# Patient Record
Sex: FEMALE | Race: WHITE | NOT HISPANIC OR LATINO | URBAN - METROPOLITAN AREA
[De-identification: names, ages, dates, MRNs, and addresses within clinical notes are randomized per-mention and may not be internally consistent; named-entity substitution may affect disease eponyms.]

---

## 2024-07-18 ENCOUNTER — APPOINTMENT (OUTPATIENT)
Dept: LAB | Age: 64
End: 2024-07-18
Payer: COMMERCIAL

## 2024-07-18 DIAGNOSIS — Z00.00 ROUTINE ADULT HEALTH MAINTENANCE: ICD-10-CM

## 2024-07-18 DIAGNOSIS — R53.83 FATIGUE, UNSPECIFIED TYPE: ICD-10-CM

## 2024-07-18 DIAGNOSIS — M81.0 OSTEOPOROSIS, UNSPECIFIED OSTEOPOROSIS TYPE, UNSPECIFIED PATHOLOGICAL FRACTURE PRESENCE: ICD-10-CM

## 2024-07-18 LAB
25(OH)D3 SERPL-MCNC: 34.3 NG/ML (ref 30–100)
ALBUMIN SERPL BCG-MCNC: 4.4 G/DL (ref 3.5–5)
ALP SERPL-CCNC: 28 U/L (ref 34–104)
ALT SERPL W P-5'-P-CCNC: 18 U/L (ref 7–52)
ANION GAP SERPL CALCULATED.3IONS-SCNC: 12 MMOL/L (ref 4–13)
AST SERPL W P-5'-P-CCNC: 21 U/L (ref 13–39)
BASOPHILS # BLD AUTO: 0.05 THOUSANDS/ÂΜL (ref 0–0.1)
BASOPHILS NFR BLD AUTO: 1 % (ref 0–1)
BILIRUB SERPL-MCNC: 0.47 MG/DL (ref 0.2–1)
BUN SERPL-MCNC: 33 MG/DL (ref 5–25)
CALCIUM SERPL-MCNC: 10.1 MG/DL (ref 8.4–10.2)
CHLORIDE SERPL-SCNC: 100 MMOL/L (ref 96–108)
CHOLEST SERPL-MCNC: 249 MG/DL
CO2 SERPL-SCNC: 28 MMOL/L (ref 21–32)
CREAT SERPL-MCNC: 1.39 MG/DL (ref 0.6–1.3)
EOSINOPHIL # BLD AUTO: 0.22 THOUSAND/ÂΜL (ref 0–0.61)
EOSINOPHIL NFR BLD AUTO: 3 % (ref 0–6)
ERYTHROCYTE [DISTWIDTH] IN BLOOD BY AUTOMATED COUNT: 12.1 % (ref 11.6–15.1)
GFR SERPL CREATININE-BSD FRML MDRD: 39 ML/MIN/1.73SQ M
GLUCOSE P FAST SERPL-MCNC: 92 MG/DL (ref 65–99)
HCT VFR BLD AUTO: 41.8 % (ref 34.8–46.1)
HDLC SERPL-MCNC: 55 MG/DL
HGB BLD-MCNC: 13.2 G/DL (ref 11.5–15.4)
IMM GRANULOCYTES # BLD AUTO: 0.02 THOUSAND/UL (ref 0–0.2)
IMM GRANULOCYTES NFR BLD AUTO: 0 % (ref 0–2)
LDLC SERPL CALC-MCNC: 160 MG/DL (ref 0–100)
LYMPHOCYTES # BLD AUTO: 3.19 THOUSANDS/ÂΜL (ref 0.6–4.47)
LYMPHOCYTES NFR BLD AUTO: 46 % (ref 14–44)
MCH RBC QN AUTO: 30.3 PG (ref 26.8–34.3)
MCHC RBC AUTO-ENTMCNC: 31.6 G/DL (ref 31.4–37.4)
MCV RBC AUTO: 96 FL (ref 82–98)
MONOCYTES # BLD AUTO: 0.7 THOUSAND/ÂΜL (ref 0.17–1.22)
MONOCYTES NFR BLD AUTO: 10 % (ref 4–12)
NEUTROPHILS # BLD AUTO: 2.79 THOUSANDS/ÂΜL (ref 1.85–7.62)
NEUTS SEG NFR BLD AUTO: 40 % (ref 43–75)
NONHDLC SERPL-MCNC: 194 MG/DL
NRBC BLD AUTO-RTO: 0 /100 WBCS
PLATELET # BLD AUTO: 254 THOUSANDS/UL (ref 149–390)
PMV BLD AUTO: 11.3 FL (ref 8.9–12.7)
POTASSIUM SERPL-SCNC: 4.4 MMOL/L (ref 3.5–5.3)
PROT SERPL-MCNC: 7.2 G/DL (ref 6.4–8.4)
PTH-INTACT SERPL-MCNC: 51.4 PG/ML (ref 12–88)
RBC # BLD AUTO: 4.36 MILLION/UL (ref 3.81–5.12)
SODIUM SERPL-SCNC: 140 MMOL/L (ref 135–147)
TRIGL SERPL-MCNC: 172 MG/DL
TSH SERPL DL<=0.05 MIU/L-ACNC: 3.27 UIU/ML (ref 0.45–4.5)
WBC # BLD AUTO: 6.97 THOUSAND/UL (ref 4.31–10.16)

## 2024-07-18 PROCEDURE — 83970 ASSAY OF PARATHORMONE: CPT

## 2024-07-18 PROCEDURE — 80061 LIPID PANEL: CPT

## 2024-07-18 PROCEDURE — 84443 ASSAY THYROID STIM HORMONE: CPT

## 2024-07-18 PROCEDURE — 85025 COMPLETE CBC W/AUTO DIFF WBC: CPT

## 2024-07-18 PROCEDURE — 82306 VITAMIN D 25 HYDROXY: CPT

## 2024-07-18 PROCEDURE — 36415 COLL VENOUS BLD VENIPUNCTURE: CPT

## 2024-07-18 PROCEDURE — 80053 COMPREHEN METABOLIC PANEL: CPT

## 2024-07-30 ENCOUNTER — OFFICE VISIT (OUTPATIENT)
Dept: OBGYN CLINIC | Facility: CLINIC | Age: 64
End: 2024-07-30
Payer: COMMERCIAL

## 2024-07-30 VITALS — WEIGHT: 156 LBS | BODY MASS INDEX: 31.45 KG/M2 | HEIGHT: 59 IN

## 2024-07-30 DIAGNOSIS — M16.11 PRIMARY OSTEOARTHRITIS OF RIGHT HIP: ICD-10-CM

## 2024-07-30 DIAGNOSIS — M51.36 DDD (DEGENERATIVE DISC DISEASE), LUMBAR: ICD-10-CM

## 2024-07-30 DIAGNOSIS — M17.11 PRIMARY OSTEOARTHRITIS OF RIGHT KNEE: ICD-10-CM

## 2024-07-30 DIAGNOSIS — M25.551 ACUTE RIGHT HIP PAIN: Primary | ICD-10-CM

## 2024-07-30 DIAGNOSIS — M25.561 RIGHT KNEE PAIN, UNSPECIFIED CHRONICITY: ICD-10-CM

## 2024-07-30 PROCEDURE — 99244 OFF/OP CNSLTJ NEW/EST MOD 40: CPT | Performed by: FAMILY MEDICINE

## 2024-07-30 NOTE — LETTER
July 30, 2024     Douglas Lovett MD  [address] [place] [id number]    Patient: Jesus Shelley   YOB: 1960   Date of Visit: 7/30/2024       Dear Dr. Lovett:    Thank you for referring Jesus Shelley to me for evaluation. Below are the relevant portions of my assessment and plan of care.         If you have questions, please do not hesitate to call me. I look forward to following Jesus along with you.         Sincerely,        Liliya Vences,         CC: No Recipients

## 2024-07-30 NOTE — PROGRESS NOTES
Assessment:     1. Acute right hip pain  CANCELED: XR hip/pelvis 4+ vw right if performed      2. Right knee pain, unspecified chronicity  Ambulatory Referral to Orthopedic Surgery    XR knee 4+ vw right injury    Ambulatory Referral to Orthopedic Surgery      3. Primary osteoarthritis of right knee  Ambulatory Referral to Orthopedic Surgery      4. Primary osteoarthritis of right hip        5. DDD (degenerative disc disease), lumbar          Orders Placed This Encounter   Procedures    XR knee 4+ vw right injury    Ambulatory Referral to Orthopedic Surgery        Impression:   Right knee pain likely secondary to severe primary knee osteoarthritis.    Right hip pain likely secondary to primary hip osteoarthritis  Translation #[id number]    Conservative Management   We discussed different treatment options:  No prior documentations to review at this time.  Osteoarthritis treatment goal is to minimize pain and optimize function.  First-line management  Ice or Heat Therapy as needed 1-2 times daily for 10-20 minutes. As tolerated.   Over the counter Tylenol and/or NSAIDs  as needed based off your Past Medical Hx. Please follow product label for dosing and maximum limits.  If you have concerns about utilizing Tylenol/NSAIDs please discuss with your primary care physician.  Trial of over the counter Topical Analgesics such as Lidocaine cream or Voltaren Gel, as tolerated. If skin becomes irritated, discontinue use.   Please range joint through gentle range of motion, as tolerated.   Initiate Home Exercise Program for Stretching and Strengthening affected area.    Weight management may benefit lower extremity osteoarthritis.  Normal BMI 18 through 24.  May request referral to weight management or nutritionist/dietitian.  Initiate Formal Physical Therapy at any preferred location.   Due to severity of knee osteoarthritis will have patient follow-up with orthopedic surgeon at this time.  Additional management  Optional  treatment measures include the following  For hip/knee osteoarthritis: Medial  brace for patients with medial compartmental osteoarthritis.   Recommended potential medial .  Did discuss I wanted patient to be seen by orthopedic surgery first.  For hip/knee osteoarthritis assistive ambulation devices: Cane, walker   Recommended cane or walker  Interarticular glucocorticoid steroid if needing short-term pain relief   Reviewed.  Patient states she has not trialed corticosteroid injections to date.  May consider viscosupplementation and/or PRP injections  Referral to an orthopedic surgeon to discuss potential surgical management    Referral to orthopedic surgery at this time.          Imaging   All imaging from today was reviewed by myself and explained to the patient.   07/30/2024 right knee x-ray: No acute osseous abnormality, no loosening of hardware  Kellgren-Waqas Classification     Present Grade Radiological findings   [] 0 No radiological findings   [] 1 Doubtful narrowing of joint space and possible osteophytic lipping   [] 2 Definite osteophytes and possible narrowing of joint space   [] 3 Moderate multiple osteophytes, definitive narrowing of joint space, small pseudocystic areas with sclerotic walls and possibly deformity of bone contour   [x] 4 Large osteophytes, marked narrowing of joint space, severe sclerosis and definitive deformity of bone contour   **If more than 1 [x] is present patient is between grades     07/30/2024 right hip x-ray: No acute osseous abnormality, degenerative changes of the right hip joint as well as the lumbar spine  Kellgren-Waqas Classification     Present Grade Radiological findings   [] 0 No radiological findings   [] 1 Doubtful narrowing of joint space and possible osteophytic lipping   [x] 2 Definite osteophytes and possible narrowing of joint space   [] 3 Moderate multiple osteophytes, definitive narrowing of joint space, small pseudocystic areas with  "sclerotic walls and possibly deformity of bone contour   [] 4 Large osteophytes, marked narrowing of joint space, severe sclerosis and definitive deformity of bone contour   **If more than 1 [x] is present patient is between grades       Procedure  No Injection performed today. May consider future corticosteroid injection depending on clinical exam/imaging.    Shared decision making, patient agreeable to plan.      Return for Follow up with Orthopedic Surgeon.    HPI:   Jesus Shelley is a 64 y.o. female  who presents for evaluation of   Chief Complaint   Patient presents with    Right Knee - Pain   PCP, Douglas Lovett MD  referred patient for right knee pain  Pt reports right knee pain pain with prior hx of \"4 screws to her knee cap\" ~ 1978. Pt reports right knee pain x20 years with worsening pain in the past 5 years. No recent injuries falls or trauma.  Pain is worse prolonged sitting and is associated with stiffness. No pain with ambulation. Pain radiates to right hip.     Onset/Mechanism: 20 years ago; worse over the past 5 years. No recent injuries or trauma.  Location: right knee medial joint line/anterior groin right hip  Severity: Current severity: 7-8/10. Max severity: 7-8/10.  Pain described as: dull, aching  Radiation: right hip.  Provocative: prolonged sitting.  Associated symptoms: joint stiffness.    HX of fracture of affected limb.  Approximately 1978, to the right knee only  HX of surgery of affected limb.  Approximately 197 8, to the right knee only    No history of surgery or fracture to the right hip    Summary of treatment to-date:   Denies corticosteroid injections  Did trial bracing and formal PT back when the incident occurred, nothing recently      Following History Reviewed and Updated   History reviewed. No pertinent past medical history.  Past Surgical History:   Procedure Laterality Date    HYMENECTOMY  09/2023    KNEE SURGERY  1976     History reviewed. No pertinent family " "history.    Social History     Substance and Sexual Activity   Alcohol Use None     Social History     Substance and Sexual Activity   Drug Use Not on file     Social History     Tobacco Use   Smoking Status Never   Smokeless Tobacco Never       Social Determinants of Health     Tobacco Use: Low Risk  (7/30/2024)    Patient History     Smoking Tobacco Use: Never     Smokeless Tobacco Use: Never     Passive Exposure: Not on file   Alcohol Use: Not on file   Financial Resource Strain: Not on file   Food Insecurity: Not on file   Transportation Needs: Not on file   Physical Activity: Not on file   Stress: Not on file   Social Connections: Not on file   Intimate Partner Violence: Not on file   Depression: Not on file   Housing Stability: Not on file   Utilities: Not on file   Health Literacy: Not on file        No Known Allergies    Review of Systems      Review of Systems     Review of Systems   Constitutional: Negative for chills and fever.   HENT: Negative for drooling and sneezing.    Eyes: Negative for redness.   Respiratory: Negative for cough and wheezing.    Gastrointestinal: Negative for vomiting.   Psychiatric/Behavioral: Negative for behavioral problems. The patient is not nervous/anxious.      All other systems negative.   Physical Exam   Physical Exam    Vitals and nursing note reviewed.  Constitutional:   Appearance. Normal Appearance.  Ht 4' 11\" (1.499 m)   Wt 70.8 kg (156 lb)   BMI 31.51 kg/m²     Body mass index is 31.51 kg/m².   HENT:  Head: Atraumatic.  Nose: Nose normal  Eyes: Conjunctiva/sclera: Conjunctivae normal.  Cardiovascular:   Rate and Rhythm: Bilateral equal distal pulses  Pulmonary:   Effort: Pulmonary effort is normal  Skin:   General: Skin is warm and dry.  Neurological:   General: No focal deficit present.  Mental Status: Alert and oriented to person, place, and time.   Psychiatric:   Mood and Affect: mood normal.  Behavior: Behavior normal     Musculoskeletal Exam     Ortho Exam "     Right knee:     Inspection:    Erythema Bruising Effusion Muscle atrophy Retracted muscle   Negative Negative Negative Negative Negative     Palpation:  Increased warmth Crepitus Palpable muscle depression   Negative Positive Negative     Tenderness:  Quadriceps tendon Patella Patellar tendon  Joint line    Negative  Medial joint line reproducing chief complaint.        Tibial tubercle Eva's tubercle Pes anserine bursa Posterior knee   Negative          Biceps femoris Semimembranosus/semitendinosis Popliteus tendon Fibular head      Negative       Bilateral Range of Motion:  Active flexion Passive flexion   (normal 135 degrees) (normal 135 degrees)   Limited, 90 degrees      Active extension Passive extension   (normal 0 degrees) (normal 0 degrees)   Intact        Bilateral strength:  Seated hip flexion Seated knee flexion Seated knee extension   5/5 5/5 5/5     Seated great toe extension Seated ankle dorsiflexion Seated ankle plantarflexion   5/5 5/5 5/5     Seated hip adduction Seated hip abduction Prone knee flexion            Ligament testing:  Anterior cruciate ligament (ACL)  Posterior cruciate ligament (PCL) Medial Collateral Ligament (MCL)  Lateral Collateral Ligament (LCL):   Lachman's Posterior drawer's Valgus Varus   Negative for laxity Negative for laxity Discomfort with minimal laxity Negative for laxity     Meniscal testing:  Medial Shant Lateral Shant Apley's Thessaly's Bounce home test   Discomfort Negative N/A N/A N/A     Special tests:  Ely's test: Robby test Eugenio's test      Rectus femoris: Prone position with passive knee flexion iliopsoas: supine position with passive hip flexion  seated position,active internal rotation of the tibia, knee extended    Contralateral leg remains on the table without contracture        Neurovascular:  Sensation to light touch Skin   Intact and equal bilaterally Warm and dry     (Test not completed if space left blank )        Bilateral HIP and BACK  "      LUMBAR Range of Motion:  Grossly intact  Patient is able to ambulate from a seated position up onto the exam table and lay supine  Flexion Extension Sidebending Rotation           HIP Range of Motion:  Hip Supine Supine Prone Prone   Flexion ER IR ER IR   Intact and symmetrical  Intact and symmetrical  Intact and symmetrical          SPECIAL TESTS:   B/L Hip  Logroll ALEJANDRA FADIR Kamari's Popliteal angle Supine straight leg Prone straight leg   Discomfort in the medial knee, no discomfort within the anterior groin Negative Negative   Negative N/A     SI JOINT ASIS COMPRESSION TEST:  STORK TEST:  FORTON'S FINGER: ALEJANDRA SI PAIN:            Special Tests:   Robby test Bicycle test Adductor squeeze Piriformis TEST:   GAENSLIN'S TEST:  Pubalgia   Supine, unaffected hip is hyperflexed Supine, opposite active b/l hip flexion / extension  Supine, hips flexed 45, active activation of adductors  Lies on unaffected hip with knees flexed and abducts against resistance  Hyperflexed unaffected hip, extended hip has downward force applied  Lying supine, perform sit-up   Negative or without hip flexion contracture of contralateral leg                  Procedures       Portions of the record may have been created with voice recognition software. Occasional wrong word or \"sound alike\" substitutions may have occurred due to the inherent limitations of voice recognition software. Please review the chart carefully and recognize, using context, where substitutions/typographical errors may have occurred.   "

## 2024-08-12 ENCOUNTER — HOSPITAL ENCOUNTER (OUTPATIENT)
Dept: BONE DENSITY | Facility: MEDICAL CENTER | Age: 64
Discharge: HOME | End: 2024-08-12
Payer: COMMERCIAL

## 2024-08-12 DIAGNOSIS — M81.0 OSTEOPOROSIS, UNSPECIFIED OSTEOPOROSIS TYPE, UNSPECIFIED PATHOLOGICAL FRACTURE PRESENCE: ICD-10-CM

## 2024-08-12 PROCEDURE — 77080 DXA BONE DENSITY AXIAL: CPT

## 2024-10-15 ENCOUNTER — OFFICE VISIT (OUTPATIENT)
Age: 64
End: 2024-10-15
Payer: COMMERCIAL

## 2024-10-15 VITALS — WEIGHT: 155.4 LBS | HEIGHT: 59 IN | BODY MASS INDEX: 31.33 KG/M2

## 2024-10-15 DIAGNOSIS — M12.561 TRAUMATIC ARTHRITIS OF RIGHT KNEE: Primary | ICD-10-CM

## 2024-10-15 DIAGNOSIS — M25.561 RIGHT KNEE PAIN, UNSPECIFIED CHRONICITY: ICD-10-CM

## 2024-10-15 PROCEDURE — 99214 OFFICE O/P EST MOD 30 MIN: CPT | Performed by: STUDENT IN AN ORGANIZED HEALTH CARE EDUCATION/TRAINING PROGRAM

## 2024-10-15 NOTE — PROGRESS NOTES
Knee New Office Note    Assessment:     1. Traumatic arthritis of right knee    2. Right knee pain, unspecified chronicity        Plan:     Problem List Items Addressed This Visit          Musculoskeletal and Integument    Traumatic arthritis of right knee - Primary    Relevant Orders    Medial  Knee Brace     Other Visit Diagnoses       Right knee pain, unspecified chronicity               65 y/o female with right knee pain secondary to severe, bone on bone post-traumatic OA with hardware prominence in he joint.  Xrays of the Right knee reviewed today showing severe arthritic changes with decreased joint space, osteophyte formation, and varus alignment. She also has her two previous screws present, the most proximal ones protruding into the joint.  On physical exam she has significant limited range of motion with pain, pain over the medial joint line, and varus alignment.  Discussed conservative treatment options to include cortisone injections, oral NSAIDs, Tylenol, activity modifications, staying active with low impact exercises, bracing and weight loss vs surgical treatment option of TKA.  At this time the patient wishes to proceed with bracing.  Medial  brace ordered today.  Follow up in 2 months to see how she is doing with the brace.    Subjective:     Patient ID: Jesus Shelley is a 64 y.o. female.  Chief Complaint:  HPI:  64 y.o. female who presents today for an evaluation of her RIGHT knee.  She states that she had an injury about 45 years ago where she sustained a fracture, which required surgical fixation with 2 screws.  She has been having pain in her knee which has been worsening over the past 10 years.  She was seen in [place] for her knee, where she was given a brace for her ankle.  She has not had any treatment for her right knee.  She states that she feels that the bones are rubbing together and that her knee is going to give way on her.  The majority of her pain is localized  medially and she has lost range of motion in her knee.    [facility]  #[id number]    Allergy:  No Known Allergies  Medications:  all current active meds have been reviewed  Past Medical History:  History reviewed. No pertinent past medical history.  Past Surgical History:  Past Surgical History:   Procedure Laterality Date    HYMENECTOMY  09/2023    KNEE SURGERY  1976     Family History:  History reviewed. No pertinent family history.  Social History:  Social History     Substance and Sexual Activity   Alcohol Use None     Social History     Substance and Sexual Activity   Drug Use Not on file     Social History     Tobacco Use   Smoking Status Never   Smokeless Tobacco Never           ROS:  General: Per HPI  Skin: Negative, except if noted below  HEENT: Negative  Respiratory: Negative  Cardiovascular: Negative  Gastrointestinal: Negative  Urinary: Negative  Vascular: Negative  Musculoskeletal: Positive per HPI   Neurologic: Positive per HPI  Endocrine: Negative    Objective:  BP Readings from Last 1 Encounters:   07/17/24 132/60      Wt Readings from Last 1 Encounters:   10/15/24 70.5 kg (155 lb 6.4 oz)        Respiratory:   non-labored respirations    Lymphatics:  no palpable lymph nodes    Gait:   antalgic    Neurologic:   Alert and oriented times 3  Patient with normal sensation except as noted below  Deep tendon reflexes 2+ except as noted in MSK exam    Bilateral Lower Extremity:  Right knee:     Inspection:  skin intact, previous surgical scar noted    Overall limb alignment varus    Effusion: trace    ROM 10-80* with pain    Extensor Lag: none    Palpation: medial Joint line tenderness to palpation    AP Stability at 90 deg stable    M/L stability in full extension +instabilty    M/L stability in midflexion +instability    Motor: 5/5 Q/HS/TA/GS/P    Pulses: 2+ DP / 2+ PT    SILT DP/SP/S/S/TN    Imaging:  My interpretation XR AP scanogram/AP bilateral knee/lateral/resendiz/sunrise right knee: severe joint  "space narrowing, subchondral sclerosis, subchondral cysts, osteophyte formation. Medial tibial bone loss. No fracture or dislocation. Proximal screw is now intraarticular due to bone loss medially. Reverse slope of tibial plateau.     BMI:   Estimated body mass index is 31.39 kg/m² as calculated from the following:    Height as of this encounter: 4' 11\" (1.499 m).    Weight as of this encounter: 70.5 kg (155 lb 6.4 oz).  BSA:   Estimated body surface area is 1.66 meters squared as calculated from the following:    Height as of this encounter: 4' 11\" (1.499 m).    Weight as of this encounter: 70.5 kg (155 lb 6.4 oz).           Scribe Attestation      I,:  Madelin Penny PA-C am acting as a scribe while in the presence of the attending physician.:       I,:  Tristin Rodriguez, DO personally performed the services described in this documentation    as scribed in my presence.:               "

## 2024-10-21 ENCOUNTER — OFFICE VISIT (OUTPATIENT)
Age: 64
End: 2024-10-21
Payer: COMMERCIAL

## 2024-10-21 VITALS
WEIGHT: 156 LBS | HEIGHT: 59 IN | SYSTOLIC BLOOD PRESSURE: 128 MMHG | DIASTOLIC BLOOD PRESSURE: 74 MMHG | BODY MASS INDEX: 31.45 KG/M2

## 2024-10-21 DIAGNOSIS — M12.561 TRAUMATIC ARTHRITIS OF RIGHT KNEE: Primary | ICD-10-CM

## 2024-10-21 PROCEDURE — 99213 OFFICE O/P EST LOW 20 MIN: CPT | Performed by: PHYSICIAN ASSISTANT

## 2024-10-25 ENCOUNTER — TELEPHONE (OUTPATIENT)
Age: 64
End: 2024-10-25

## 2024-10-25 NOTE — TELEPHONE ENCOUNTER
Yep this is [facility].    I've added Kym onto this message as well so she can be set up for the brace.

## 2024-10-25 NOTE — TELEPHONE ENCOUNTER
Caller:  (outreach program)     Doctor: Kimberli     Reason for call: Asked where does patient go to get measurements for brace?     Call back#: [phone]

## 2024-10-28 ENCOUNTER — DOCUMENTATION (OUTPATIENT)
Dept: OBGYN CLINIC | Facility: MEDICAL CENTER | Age: 64
End: 2024-10-28

## 2024-11-14 ENCOUNTER — OFFICE VISIT (OUTPATIENT)
Dept: OBGYN CLINIC | Facility: CLINIC | Age: 64
End: 2024-11-14
Payer: COMMERCIAL

## 2024-11-14 VITALS
WEIGHT: 156 LBS | SYSTOLIC BLOOD PRESSURE: 102 MMHG | DIASTOLIC BLOOD PRESSURE: 50 MMHG | HEIGHT: 59 IN | BODY MASS INDEX: 31.45 KG/M2

## 2024-11-14 DIAGNOSIS — M12.561 TRAUMATIC ARTHRITIS OF RIGHT KNEE: Primary | ICD-10-CM

## 2024-11-14 PROCEDURE — 99213 OFFICE O/P EST LOW 20 MIN: CPT | Performed by: PHYSICIAN ASSISTANT

## 2024-11-14 PROCEDURE — 20610 DRAIN/INJ JOINT/BURSA W/O US: CPT | Performed by: PHYSICIAN ASSISTANT

## 2024-11-14 RX ORDER — ROPIVACAINE HYDROCHLORIDE 2 MG/ML
4 INJECTION, SOLUTION EPIDURAL; INFILTRATION; PERINEURAL
Status: COMPLETED | OUTPATIENT
Start: 2024-11-14 | End: 2024-11-14

## 2024-11-14 RX ORDER — METHYLPREDNISOLONE ACETATE 40 MG/ML
1 INJECTION, SUSPENSION INTRA-ARTICULAR; INTRALESIONAL; INTRAMUSCULAR; SOFT TISSUE
Status: COMPLETED | OUTPATIENT
Start: 2024-11-14 | End: 2024-11-14

## 2024-11-14 RX ORDER — ACETAMINOPHEN 500 MG
1000 TABLET ORAL EVERY 8 HOURS PRN
Qty: 90 TABLET | Refills: 0 | Status: SHIPPED | OUTPATIENT
Start: 2024-11-14

## 2024-11-14 RX ADMIN — METHYLPREDNISOLONE ACETATE 1 ML: 40 INJECTION, SUSPENSION INTRA-ARTICULAR; INTRALESIONAL; INTRAMUSCULAR; SOFT TISSUE at 10:30

## 2024-11-14 RX ADMIN — ROPIVACAINE HYDROCHLORIDE 4 ML: 2 INJECTION, SOLUTION EPIDURAL; INFILTRATION; PERINEURAL at 10:30

## 2025-03-07 ENCOUNTER — APPOINTMENT (OUTPATIENT)
Dept: LAB | Age: 65
End: 2025-03-07
Payer: COMMERCIAL

## 2025-03-07 DIAGNOSIS — E78.5 HYPERLIPIDEMIA, UNSPECIFIED HYPERLIPIDEMIA TYPE: ICD-10-CM

## 2025-03-07 LAB
ALBUMIN SERPL BCG-MCNC: 4.4 G/DL (ref 3.5–5)
ALP SERPL-CCNC: 30 U/L (ref 34–104)
ALT SERPL W P-5'-P-CCNC: 17 U/L (ref 7–52)
ANION GAP SERPL CALCULATED.3IONS-SCNC: 11 MMOL/L (ref 4–13)
AST SERPL W P-5'-P-CCNC: 21 U/L (ref 13–39)
BASOPHILS # BLD AUTO: 0.05 THOUSANDS/ÂΜL (ref 0–0.1)
BASOPHILS NFR BLD AUTO: 1 % (ref 0–1)
BILIRUB SERPL-MCNC: 0.47 MG/DL (ref 0.2–1)
BUN SERPL-MCNC: 22 MG/DL (ref 5–25)
CALCIUM SERPL-MCNC: 9.3 MG/DL (ref 8.4–10.2)
CHLORIDE SERPL-SCNC: 100 MMOL/L (ref 96–108)
CHOLEST SERPL-MCNC: 167 MG/DL (ref ?–200)
CK SERPL-CCNC: 79 U/L (ref 26–192)
CO2 SERPL-SCNC: 26 MMOL/L (ref 21–32)
CREAT SERPL-MCNC: 1.31 MG/DL (ref 0.6–1.3)
EOSINOPHIL # BLD AUTO: 0.21 THOUSAND/ÂΜL (ref 0–0.61)
EOSINOPHIL NFR BLD AUTO: 3 % (ref 0–6)
ERYTHROCYTE [DISTWIDTH] IN BLOOD BY AUTOMATED COUNT: 12 % (ref 11.6–15.1)
GFR SERPL CREATININE-BSD FRML MDRD: 42 ML/MIN/1.73SQ M
GLUCOSE P FAST SERPL-MCNC: 93 MG/DL (ref 65–99)
HCT VFR BLD AUTO: 38.5 % (ref 34.8–46.1)
HDLC SERPL-MCNC: 51 MG/DL
HGB BLD-MCNC: 12.4 G/DL (ref 11.5–15.4)
IMM GRANULOCYTES # BLD AUTO: 0.02 THOUSAND/UL (ref 0–0.2)
IMM GRANULOCYTES NFR BLD AUTO: 0 % (ref 0–2)
LDLC SERPL CALC-MCNC: 78 MG/DL (ref 0–100)
LYMPHOCYTES # BLD AUTO: 2.79 THOUSANDS/ÂΜL (ref 0.6–4.47)
LYMPHOCYTES NFR BLD AUTO: 41 % (ref 14–44)
MCH RBC QN AUTO: 30.5 PG (ref 26.8–34.3)
MCHC RBC AUTO-ENTMCNC: 32.2 G/DL (ref 31.4–37.4)
MCV RBC AUTO: 95 FL (ref 82–98)
MONOCYTES # BLD AUTO: 0.59 THOUSAND/ÂΜL (ref 0.17–1.22)
MONOCYTES NFR BLD AUTO: 9 % (ref 4–12)
NEUTROPHILS # BLD AUTO: 3.11 THOUSANDS/ÂΜL (ref 1.85–7.62)
NEUTS SEG NFR BLD AUTO: 46 % (ref 43–75)
NONHDLC SERPL-MCNC: 116 MG/DL
NRBC BLD AUTO-RTO: 0 /100 WBCS
PLATELET # BLD AUTO: 254 THOUSANDS/UL (ref 149–390)
PMV BLD AUTO: 11.2 FL (ref 8.9–12.7)
POTASSIUM SERPL-SCNC: 4.3 MMOL/L (ref 3.5–5.3)
PROT SERPL-MCNC: 7 G/DL (ref 6.4–8.4)
RBC # BLD AUTO: 4.07 MILLION/UL (ref 3.81–5.12)
SODIUM SERPL-SCNC: 137 MMOL/L (ref 135–147)
TRIGL SERPL-MCNC: 188 MG/DL (ref ?–150)
WBC # BLD AUTO: 6.77 THOUSAND/UL (ref 4.31–10.16)

## 2025-03-07 PROCEDURE — 80061 LIPID PANEL: CPT

## 2025-03-07 PROCEDURE — 80053 COMPREHEN METABOLIC PANEL: CPT

## 2025-03-07 PROCEDURE — 82550 ASSAY OF CK (CPK): CPT

## 2025-03-07 PROCEDURE — 85025 COMPLETE CBC W/AUTO DIFF WBC: CPT

## 2025-07-18 ENCOUNTER — OFFICE VISIT (OUTPATIENT)
Dept: OBGYN CLINIC | Facility: MEDICAL CENTER | Age: 65
End: 2025-07-18
Payer: COMMERCIAL

## 2025-07-18 VITALS — BODY MASS INDEX: 32.25 KG/M2 | WEIGHT: 160 LBS | HEIGHT: 59 IN

## 2025-07-18 DIAGNOSIS — M25.561 CHRONIC PAIN OF RIGHT KNEE: Primary | ICD-10-CM

## 2025-07-18 DIAGNOSIS — M12.561 TRAUMATIC ARTHRITIS OF RIGHT KNEE: ICD-10-CM

## 2025-07-18 DIAGNOSIS — G89.29 CHRONIC PAIN OF RIGHT KNEE: Primary | ICD-10-CM

## 2025-07-18 PROCEDURE — 99214 OFFICE O/P EST MOD 30 MIN: CPT | Performed by: EMERGENCY MEDICINE

## 2025-07-18 NOTE — ASSESSMENT & PLAN NOTE
Reviewed ortho note, ortho clinica PA notes, and Xrays Right knee  She is not interested in surgery at this time but I am concerned that conservative treatment may not help much  Declined CSI  Offered hinged knee brace as she did not like the medial     Orders:    Durable Medical Equipment    
107

## 2025-07-18 NOTE — PROGRESS NOTES
"Name: Jesus Shelley      : 1960      MRN: [id number]  Encounter Provider: Ranulfo Zambrano MD  Encounter Date: 2025   Encounter department: [facility]  :  Assessment & Plan  Chronic pain of right knee  Traumatic arthritis of right knee  Reviewed ortho note, ortho clinica PA notes, and Xrays Right knee  She is not interested in surgery at this time but I am concerned that conservative treatment may not help much  Declined CSI  Offered hinged knee brace as she did not like the medial     Orders:    Durable Medical Equipment        Return for F/U WITH CELESTINO.      Subjective:     History of Present Illness   NP PMHx \"Low bone mass\" presents for chronic right knee pain and post-traumatic DJD with hardware.  Previous evaluation with Orthopedic surgeon, s/p bracing and CSI.            Review of Systems    The following portions of the patient's chart were reviewed and updated as appropriate:   Allergy:  Allergies[1]    Medications:  Current Medications[2]    Problem List[3]    Objective:  Objective   Ht 4' 11\" (1.499 m)   Wt 72.6 kg (160 lb)   BMI 32.32 kg/m²         Right Knee Exam     Range of Motion   Extension:  abnormal   Flexion:  abnormal     Other   Erythema: absent    Comments:  Incision well appearing, no signs infection  Limited ROM            Physical Exam      Neurologic Exam    Procedures    I have personally reviewed pertinent films in PACS. and I have personally reviewed the written report of the pertinent studies.             Past Medical History[4]    Past Surgical History[5]    Social History     Socioeconomic History    Marital status: /Civil Union     Spouse name: Not on file    Number of children: Not on file    Years of education: Not on file    Highest education level: Not on file   Occupational History    Not on file   Tobacco Use    Smoking status: Never    Smokeless tobacco: Never   Substance and Sexual Activity    Alcohol use: " Not on file    Drug use: Not on file    Sexual activity: Not on file   Other Topics Concern    Not on file   Social History Narrative    Not on file     Social Drivers of Health     Financial Resource Strain: Not on file   Food Insecurity: Not on file   Transportation Needs: Not on file   Physical Activity: Not on file   Stress: Not on file   Social Connections: Not on file   Intimate Partner Violence: Not on file   Housing Stability: Not on file       Family History[6]           [1] No Known Allergies  [2]   Current Outpatient Medications:     acetaminophen (TYLENOL) 500 mg tablet, Take 2 tablets (1,000 mg total) by mouth every 8 (eight) hours as needed for mild pain, Disp: 90 tablet, Rfl: 0    alendronate (Fosamax) 70 mg tablet, Take 1 tablet (70 mg total) by mouth every 7 days, Disp: 12 tablet, Rfl: 3    amLODIPine (NORVASC) 2.5 mg tablet, TAKE 1 TABLET BY MOUTH EVERY DAY, Disp: 100 tablet, Rfl: 3    atenolol-chlorthalidone (TENORETIC) 100-25 mg per tablet, TAKE 1 TABLET BY MOUTH EVERY DAY, Disp: 30 tablet, Rfl: 5    benzonatate (TESSALON PERLES) 100 mg capsule, Take 1 capsule (100 mg total) by mouth 3 (three) times a day as needed for cough, Disp: 20 capsule, Rfl: 0    benzonatate (TESSALON PERLES) 100 mg capsule, Take 1 capsule (100 mg total) by mouth 3 (three) times a day as needed for cough, Disp: 20 capsule, Rfl: 0    Calcium Carb-Cholecalciferol (calcium carbonate-vitamin D) 500 mg-5 mcg tablet, Take 1 tablet by mouth 2 (two) times a day with meals, Disp: 180 tablet, Rfl: 3    calcium carbonate-vitamin D 500 mg-5 mcg per tablet, Take 1 tablet by mouth 2 (two) times a day with meals, Disp: 180 tablet, Rfl: 3    Diclofenac Sodium (VOLTAREN) 1 %, Apply 2 g topically 4 (four) times a day, Disp: 100 g, Rfl: 1    rosuvastatin (CRESTOR) 10 MG tablet, Take 1 tablet (10 mg total) by mouth daily, Disp: 100 tablet, Rfl: 3    valsartan (DIOVAN) 80 mg tablet, TAKE 1 TABLET BY MOUTH EVERY DAY, Disp: 100 tablet, Rfl:  3  [3]   Patient Active Problem List  Diagnosis    Traumatic arthritis of right knee    Right knee pain    Chronic pain of right knee   [4] No past medical history on file.  [5]   Past Surgical History:  Procedure Laterality Date    HYMENECTOMY  09/2023    KNEE SURGERY  1976   [6] No family history on file.

## 2025-07-18 NOTE — PATIENT INSTRUCTIONS
You may take Tylenol 500mg every 4-6 hours as needed OR max 1,000mg per dose up to 3 times per day for a total of 3,000mg per day